# Patient Record
Sex: MALE | Race: BLACK OR AFRICAN AMERICAN
[De-identification: names, ages, dates, MRNs, and addresses within clinical notes are randomized per-mention and may not be internally consistent; named-entity substitution may affect disease eponyms.]

---

## 2019-01-17 ENCOUNTER — HOSPITAL ENCOUNTER (EMERGENCY)
Dept: HOSPITAL 8 - ED | Age: 29
Discharge: HOME | End: 2019-01-17
Payer: SELF-PAY

## 2019-01-17 VITALS — BODY MASS INDEX: 22.65 KG/M2 | WEIGHT: 149.47 LBS | HEIGHT: 68 IN

## 2019-01-17 VITALS — SYSTOLIC BLOOD PRESSURE: 114 MMHG | DIASTOLIC BLOOD PRESSURE: 75 MMHG

## 2019-01-17 DIAGNOSIS — J02.8: Primary | ICD-10-CM

## 2019-01-17 DIAGNOSIS — B34.9: ICD-10-CM

## 2019-01-17 PROCEDURE — 99283 EMERGENCY DEPT VISIT LOW MDM: CPT

## 2019-01-17 PROCEDURE — 71046 X-RAY EXAM CHEST 2 VIEWS: CPT

## 2019-01-17 NOTE — NUR
PT GIVEN DC INSTRUCTIONS AND SCRIPT. PT EDUCATED REGARDING DC MEDICATION WHICH 
IS TESMARLEY LAMAS. PT AMB TO DC WITH STEADY GAIT. PT AOX4. RESPS EVEN AND 
UNLABORED.

## 2019-03-09 ENCOUNTER — HOSPITAL ENCOUNTER (EMERGENCY)
Dept: HOSPITAL 8 - ED | Age: 29
Discharge: HOME | End: 2019-03-09
Payer: SELF-PAY

## 2019-03-09 VITALS — DIASTOLIC BLOOD PRESSURE: 82 MMHG | SYSTOLIC BLOOD PRESSURE: 126 MMHG

## 2019-03-09 VITALS — HEIGHT: 68 IN | BODY MASS INDEX: 23.39 KG/M2 | WEIGHT: 154.32 LBS

## 2019-03-09 DIAGNOSIS — K02.9: Primary | ICD-10-CM

## 2019-03-09 PROCEDURE — 99283 EMERGENCY DEPT VISIT LOW MDM: CPT
